# Patient Record
Sex: MALE | Race: WHITE | NOT HISPANIC OR LATINO | Employment: OTHER | URBAN - METROPOLITAN AREA
[De-identification: names, ages, dates, MRNs, and addresses within clinical notes are randomized per-mention and may not be internally consistent; named-entity substitution may affect disease eponyms.]

---

## 2024-06-26 ENCOUNTER — HOSPITAL ENCOUNTER (EMERGENCY)
Facility: HOSPITAL | Age: 34
Discharge: HOME | End: 2024-06-26
Attending: EMERGENCY MEDICINE
Payer: MEDICAID

## 2024-06-26 VITALS
BODY MASS INDEX: 28.82 KG/M2 | OXYGEN SATURATION: 99 % | TEMPERATURE: 98.1 F | HEIGHT: 77 IN | HEART RATE: 105 BPM | WEIGHT: 244.05 LBS | SYSTOLIC BLOOD PRESSURE: 147 MMHG | DIASTOLIC BLOOD PRESSURE: 88 MMHG | RESPIRATION RATE: 18 BRPM

## 2024-06-26 DIAGNOSIS — L03.115 CELLULITIS AND ABSCESS OF RIGHT LEG: Primary | ICD-10-CM

## 2024-06-26 DIAGNOSIS — L02.415 CELLULITIS AND ABSCESS OF RIGHT LEG: Primary | ICD-10-CM

## 2024-06-26 PROCEDURE — 99283 EMERGENCY DEPT VISIT LOW MDM: CPT

## 2024-06-26 RX ORDER — SULFAMETHOXAZOLE AND TRIMETHOPRIM 800; 160 MG/1; MG/1
1 TABLET ORAL 2 TIMES DAILY
Qty: 14 TABLET | Refills: 0 | Status: SHIPPED | OUTPATIENT
Start: 2024-06-26 | End: 2024-07-03

## 2024-06-26 RX ORDER — SULFAMETHOXAZOLE AND TRIMETHOPRIM 800; 160 MG/1; MG/1
2 TABLET ORAL ONCE
Status: COMPLETED | OUTPATIENT
Start: 2024-06-26 | End: 2024-06-26

## 2024-06-26 RX ORDER — SULFAMETHOXAZOLE AND TRIMETHOPRIM 800; 160 MG/1; MG/1
1 TABLET ORAL ONCE
Status: DISCONTINUED | OUTPATIENT
Start: 2024-06-26 | End: 2024-06-26

## 2024-06-26 RX ORDER — CEPHALEXIN 250 MG/1
500 CAPSULE ORAL ONCE
Status: DISCONTINUED | OUTPATIENT
Start: 2024-06-26 | End: 2024-06-26

## 2024-06-26 RX ORDER — CEPHALEXIN 250 MG/1
1000 CAPSULE ORAL ONCE
Status: COMPLETED | OUTPATIENT
Start: 2024-06-26 | End: 2024-06-26

## 2024-06-26 RX ORDER — CEPHALEXIN 500 MG/1
500 CAPSULE ORAL 4 TIMES DAILY
Qty: 28 CAPSULE | Refills: 0 | Status: SHIPPED | OUTPATIENT
Start: 2024-06-26 | End: 2024-07-03

## 2024-06-26 RX ADMIN — CEPHALEXIN 1000 MG: 250 CAPSULE ORAL at 20:36

## 2024-06-26 RX ADMIN — SULFAMETHOXAZOLE AND TRIMETHOPRIM 2 TABLET: 800; 160 TABLET ORAL at 20:36

## 2024-06-26 NOTE — ED PROVIDER NOTES
"Time: 7:14 PM EDT  Date of encounter:  6/26/2024  Independent Historian/Clinical History and Information was obtained by:   Patient    History is limited by: N/A    Chief Complaint: Wound      History of Present Illness:  Patient is a 33 y.o. year old male who presents to the emergency department for evaluation of wound.  Patient presents to the ED for complaints of pain and swelling of his right inner thigh.  He reports initially noticing a sore last night at which time he took a \"clean\" razor blade and attempted to susana the area.  He states he squeezed the area and got out a lot of white pus.  Today the redness has spread and the area has an open wound.  Unsure about any insect bites.  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  Denies fever.      No history of MRSA    HPI    Patient Care Team  Primary Care Provider: Provider, No Known    Past Medical History:   No MRSA reported.  No history of abscess or I&D  No Known Allergies  No past medical history on file.  No past surgical history on file.  No family history on file.    Home Medications:  Prior to Admission medications    Not on File        Social History:      Works construction, lives at home.    Review of Systems:  Review of Systems   Skin:  Positive for color change and wound.        Physical Exam:  /88 (BP Location: Left arm, Patient Position: Sitting)   Pulse 105   Temp 98.1 °F (36.7 °C) (Oral)   Resp 18   Ht 195.6 cm (77\")   Wt 111 kg (244 lb 0.8 oz)   SpO2 99%   BMI 28.94 kg/m²     General: Awake alert and in no obvious distress    HEENT: Head normocephalic atraumatic, eyes PERRLA EOMI, nose normal, oropharynx normal.    Neck: Supple full range of motion, no meningismus, no lymphadenopathy    Heart: Regular rate and rhythm, no murmurs or rubs, 2+ radial pulses bilaterally    Lungs: Clear to auscultation bilaterally without wheezes or crackles, no respiratory " distress    Abdomen: Soft, nontender, nondistended, no rebound or guarding    Skin: Warm, dry, there is an area about 12 x 15 cm of erythema and mild warmth and tenderness along the medial aspect of the right thigh with a central scab present and small amount of induration but no fluctuance or drainage.    Musculoskeletal: Normal range of motion, no lower extremity edema    Neurologic: Oriented x3, no motor deficits no sensory deficits    Psychiatric: Mood appears stable, no psychosis          Procedures:  Procedures      Medical Decision Making:      Comorbidities that affect care:    None    External Notes reviewed:    None      The following orders were placed and all results were independently analyzed by me:  No orders of the defined types were placed in this encounter.      Medications Given in the Emergency Department:  Medications   cephalexin (KEFLEX) capsule 1,000 mg (has no administration in time range)   sulfamethoxazole-trimethoprim (BACTRIM DS,SEPTRA DS) 800-160 MG per tablet 2 tablet (has no administration in time range)        ED Course:    ED Course as of 06/26/24 2029 Wed Jun 26, 2024 1910 -- PROVIDER IN TRIAGE NOTE ---    The patient was evaluated by me, ALIYAH Rudolph, in triage. Orders were placed and the patient is currently awaiting disposition.    [CB]      ED Course User Index  [CB] Naomie Retana APRN       Labs:    Lab Results (last 24 hours)       ** No results found for the last 24 hours. **             Imaging:    No Radiology Exams Resulted Within Past 24 Hours      Differential Diagnosis and Discussion:    Rash: Differential diagnosis includes but is not limited to sepsis, cellulitis, [place] Spotted Fever, meningitis, meningococcemia, Varicella, Strep infection, dermatitis, allergic reaction, Lyme disease, and toxic shock syndrome.        MDM         This patient is a healthy-appearing 33-year-old male who had apparently a skin abscess that he already cut open  with a razor blade and got a large amount of purulent drainage out but now there is surrounding cellulitis remaining in the right thigh.    He looks well-appearing and does not look septic.    I do not think he needs IV antibiotics or any imaging and I do not detect any fluctuance or remaining abscess that needs I&D, as he already performed this at home.    I am starting him on oral Bactrim DS and Keflex antibiotics and giving him a dose now and will have him return to the ER if he starts getting spreading redness or fevers or chills or any worsening symptoms.                Patient Care Considerations:          Consultants/Shared Management Plan:    This plan of outpatient antibiotic management was discussed with ED pharmacist.    Social Determinants of Health:    Patient is independent, reliable, and has access to care.       Disposition and Care Coordination:    Discharged: The patient is suitable and stable for discharge with no need for consideration of admission.    I have explained the patient´s condition, diagnoses and treatment plan based on the information available to me at this time. I have answered questions and addressed any concerns. The patient has a good  understanding of the patient´s diagnosis, condition, and treatment plan as can be expected at this point. The vital signs have been stable. The patient´s condition is stable and appropriate for discharge from the emergency department.      The patient will pursue further outpatient evaluation with the primary care physician or other designated or consulting physician as outlined in the discharge instructions. They are agreeable to this plan of care and follow-up instructions have been explained in detail. The patient has received these instructions in written format and has expressed an understanding of the discharge instructions. The patient is aware that any significant change in condition or worsening of symptoms should prompt an immediate  return to this or the closest emergency department or call to 911.  I have explained discharge medications and the need for follow up with the patient/caretakers. This was also printed in the discharge instructions. Patient was discharged with the following medications and follow up:      Medication List        New Prescriptions      cephalexin 500 MG capsule  Commonly known as: KEFLEX  Take 1 capsule by mouth 4 (Four) Times a Day for 7 days.     sulfamethoxazole-trimethoprim 800-160 MG per tablet  Commonly known as: BACTRIM DS,SEPTRA DS  Take 1 tablet by mouth 2 (Two) Times a Day for 7 days.               Where to Get Your Medications        These medications were sent to [facility]/pharmacy #[id number] - [place], [place] - [address] - [phone] [facility] [phone]   [address]  [place] [address]      Hours: 24-hours Phone: [phone]   cephalexin 500 MG capsule  sulfamethoxazole-trimethoprim 800-160 MG per tablet      [facility] EM[facility] RO[facility]  [address] [place] [facility] [address] [phone]  Go in 2 days  If symptoms worsen       Final diagnoses:   Cellulitis and abscess of right leg        ED Disposition       ED Disposition   Discharge    Condition   Stable    Comment   --               This medical record created using voice recognition software.             Deo Portillo MD  06/26/24 2029

## 2024-06-26 NOTE — Clinical Note
[facility] EMERGENCY ROOM  [address] [place] [address]  Phone: [phone]  Fax: [phone]    Estiven Morris was seen and treated in our emergency department on 6/26/2024.  He may return to work on 06/28/2024.         Thank you for choosing [facility].    Deo Portillo MD

## 2024-06-27 NOTE — DISCHARGE INSTRUCTIONS
Get your prescription filled ASAP at [facility], and start taking both antibiotics all week.    Return to the ER if you are having significantly worsening redness spreading (it will continue to get a bit worse tomorrow if you are not on antibiotics), or for any fevers or cold chills or feeling ill.